# Patient Record
(demographics unavailable — no encounter records)

---

## 2025-07-24 NOTE — ASSESSMENT
[FreeTextEntry1] : 77 F w/ morbid obesity, KATHERYN (not adherent to CPAP due to discomfort), presents to establish care w/ pulmonary for worsening TAPIA. Etiology not entirely clear, will need further workup.  - PFT - Pt defers sleep study for now - Start trial of Symbicort inhaler. Discussed w/ patient that if it is not covered by insurance, pt is to ask pharmacy or insurance on which one is on their formulary and notify office staff to have it switched to the insurance's preferred inhaler. Pt agrees to this plan. - Discussed and demonstrated appropriate inhaler technique for adequate medication delivery to lungs - CBC, CMP, BNP - If workup negative, may benefit from ischemic cardiac evaluation including stress test/angiogram - RTC in 6 weeks

## 2025-07-24 NOTE — PHYSICAL EXAM
[No Acute Distress] : no acute distress [No Resp Distress] : no resp distress [Clear to Auscultation Bilaterally] : clear to auscultation bilaterally [No Edema] : no edema [TextBox_2] : obesity

## 2025-07-24 NOTE — HISTORY OF PRESENT ILLNESS
[Never] : never [TextBox_4] : 77 F w/ morbid obesity, KATHERYN (not adherent to CPAP due to discomfort), presents to establish care w/ pulmonary for worsening TAPIA. She reports increased TAPIA when walking a few steps from the living room to the kitchen. No chest tightness, pain, or wheezing. No significant cough. No weight gain. She lost 30 lbs last year but regained it (through dietary changes). Has significant knee OA for which she walks with a walker to support herself. She's had cardiac evaluation including TTE which she reports was WNL.  Has seen pulmonary in the past and PFT/workup was unremarkable.  PFT 2017  FEV1 2.23L 111% FVC 2.79L 111% ratio 0.79  Never smoker